# Patient Record
Sex: MALE | Race: WHITE | NOT HISPANIC OR LATINO
[De-identification: names, ages, dates, MRNs, and addresses within clinical notes are randomized per-mention and may not be internally consistent; named-entity substitution may affect disease eponyms.]

---

## 2018-10-09 ENCOUNTER — APPOINTMENT (OUTPATIENT)
Dept: ORTHOPEDIC SURGERY | Facility: CLINIC | Age: 38
End: 2018-10-09
Payer: COMMERCIAL

## 2018-10-09 VITALS — HEIGHT: 77 IN | BODY MASS INDEX: 24.21 KG/M2 | WEIGHT: 205 LBS

## 2018-10-09 DIAGNOSIS — Z78.9 OTHER SPECIFIED HEALTH STATUS: ICD-10-CM

## 2018-10-09 PROBLEM — Z00.00 ENCOUNTER FOR PREVENTIVE HEALTH EXAMINATION: Status: ACTIVE | Noted: 2018-10-09

## 2018-10-09 PROCEDURE — 99204 OFFICE O/P NEW MOD 45 MIN: CPT

## 2018-10-09 RX ORDER — EMTRICITABINE AND TENOFOVIR DISOPROXIL FUMARATE 167; 250 MG/1; MG/1
TABLET, FILM COATED ORAL
Refills: 0 | Status: ACTIVE | COMMUNITY

## 2018-10-09 RX ORDER — FINASTERIDE 1 MG/1
TABLET ORAL
Refills: 0 | Status: ACTIVE | COMMUNITY

## 2018-10-30 ENCOUNTER — APPOINTMENT (OUTPATIENT)
Dept: ORTHOPEDIC SURGERY | Facility: CLINIC | Age: 38
End: 2018-10-30
Payer: COMMERCIAL

## 2018-10-30 VITALS — HEIGHT: 77 IN | RESPIRATION RATE: 16 BRPM | WEIGHT: 205 LBS | BODY MASS INDEX: 24.21 KG/M2

## 2018-10-30 PROCEDURE — 99213 OFFICE O/P EST LOW 20 MIN: CPT

## 2018-11-27 ENCOUNTER — APPOINTMENT (OUTPATIENT)
Dept: ORTHOPEDIC SURGERY | Facility: CLINIC | Age: 38
End: 2018-11-27
Payer: COMMERCIAL

## 2018-11-27 VITALS — HEIGHT: 77 IN | BODY MASS INDEX: 24.21 KG/M2 | WEIGHT: 205 LBS

## 2018-11-27 PROCEDURE — 99213 OFFICE O/P EST LOW 20 MIN: CPT

## 2019-01-08 ENCOUNTER — APPOINTMENT (OUTPATIENT)
Dept: ORTHOPEDIC SURGERY | Facility: CLINIC | Age: 39
End: 2019-01-08

## 2020-06-15 ENCOUNTER — APPOINTMENT (OUTPATIENT)
Dept: HEART AND VASCULAR | Facility: CLINIC | Age: 40
End: 2020-06-15
Payer: COMMERCIAL

## 2020-06-15 DIAGNOSIS — R00.1 BRADYCARDIA, UNSPECIFIED: ICD-10-CM

## 2020-06-15 DIAGNOSIS — R42 DIZZINESS AND GIDDINESS: ICD-10-CM

## 2020-06-15 DIAGNOSIS — R55 SYNCOPE AND COLLAPSE: ICD-10-CM

## 2020-06-15 DIAGNOSIS — M25.312 OTHER INSTABILITY, LEFT SHOULDER: ICD-10-CM

## 2020-06-15 PROCEDURE — 99204 OFFICE O/P NEW MOD 45 MIN: CPT | Mod: 95

## 2020-06-15 NOTE — PHYSICAL EXAM
[Normal Appearance] : normal appearance [General Appearance - Well Developed] : well developed [Well Groomed] : well groomed [No Deformities] : no deformities [General Appearance - Well Nourished] : well nourished [General Appearance - In No Acute Distress] : no acute distress

## 2020-06-15 NOTE — DISCUSSION/SUMMARY
[FreeTextEntry1] : Syncope and dizziness, as the patient is traveling, will encourage rest and hydration. Will re-evaluate. We discussed holter as well. Encourage to also touch base with primary care doctor.

## 2020-06-15 NOTE — REASON FOR VISIT
[Initial Evaluation] : an initial evaluation of [FreeTextEntry1] : This visit was provided via telehealth using real-time 2 way audio visual technology. The patient, THIAGO OBRIEN, was located at home, [address] [address] , at the time of the vist. The Doctor, Shay Biggs MD, [facility], was located at his medical office located at [address] [place] [address], [address] Floor, [place] [address] at the time of the visit. The patient, THIAGO OBRIEN and the Doctor participated in telehealth encounter. Verbal consent was given on Geovanni 15, 2020  by the patient, self.\par \par Thiago is a pleasant 39 year old man who is being evaluated secondary to dizziness and syncope. The symptoms have been present for a few days. He remarks a stressful week. The initial spell noted when getting up suddenly after a night of sleep. He remarks associated fatigue and dizziness. However, he was able to keep his exercise schedule. We are discussing a cardiac work up for the above complains as one has not been done recently.\par  [Syncope] : syncope

## 2021-04-29 ENCOUNTER — EMERGENCY (EMERGENCY)
Facility: HOSPITAL | Age: 41
LOS: 1 days | Discharge: HOME | End: 2021-04-29
Attending: EMERGENCY MEDICINE | Admitting: EMERGENCY MEDICINE
Payer: COMMERCIAL

## 2021-04-29 VITALS
RESPIRATION RATE: 17 BRPM | SYSTOLIC BLOOD PRESSURE: 124 MMHG | TEMPERATURE: 98 F | DIASTOLIC BLOOD PRESSURE: 68 MMHG | HEART RATE: 72 BPM | OXYGEN SATURATION: 98 %

## 2021-04-29 PROCEDURE — 99282 EMERGENCY DEPT VISIT SF MDM: CPT

## 2021-04-29 NOTE — ED PROVIDER NOTE - PHYSICAL EXAMINATION
GEN: Awake, alert, non-toxic appearing, NCAT  RESP: unlabored breathing, appears comfortable in RA  MSK: cruz x 4,

## 2021-04-29 NOTE — ED PROVIDER NOTE - PATIENT PORTAL LINK FT
You can access the FollowMyHealth Patient Portal offered by [facility] by registering at the following website: [email or web address]. By joining [facility] FollowMyHealth portal, you will also be able to view your health information using other applications (apps) compatible with our system.

## 2021-04-29 NOTE — ED PROVIDER NOTE - OBJECTIVE STATEMENT
40 yom pw requesting covid testing.  denies any fc/cough/sob/abd pain/nvd.  no other complaints.    Presenting symptoms: none  Negative symptoms: see above  Location: none  Radiation: none

## 2021-04-30 LAB — SARS-COV-2 RNA SPEC QL NAA+PROBE: SIGNIFICANT CHANGE UP

## 2021-05-03 DIAGNOSIS — Z20.822 CONTACT WITH AND (SUSPECTED) EXPOSURE TO COVID-19: ICD-10-CM
